# Patient Record
(demographics unavailable — no encounter records)

---

## 2024-12-31 NOTE — HISTORY OF PRESENT ILLNESS
[de-identified] : KAELYN JAQUEZ Was seen on December 31 by 2 way telemedicine he was in his [facility] home and I was in the office on [address]  He has been using the budesonide rinses and has not had another sinus infection since I last saw him.  He had imaging in [place] at [facility] radiology and comes for repeat evaluation

## 2024-12-31 NOTE — ASSESSMENT
[FreeTextEntry1] :  It was my impression that well he still has complaints of a rhinitis and nasal congestion, he has not had a recurrence of his sinus infections.  I recommend continuing with the budesonide.  I will see if we can get a copy of his imaging and would recommend further intervention after that if necessary

## 2024-12-31 NOTE — PHYSICAL EXAM
[FreeTextEntry1] : General: The patient was alert and oriented and in no distress. Voice was clear.  No further evaluation could be done by telemedicine
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
## 2025-07-29 NOTE — ASSESSMENT
[FreeTextEntry1] : It was my impression that he has a chronic rhinitis without significant sinus disease.  He has been getting now maybe 1 infection a year using the budesonide rinses.  I suggested that he continue.  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  He has the reflux and GERD which seems mostly but not completely treated.  I reviewed the diet and suggested alkaline water as well  I would like to see the patient back in follow-up in a year or earlier if needed.
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
## 2025-07-29 NOTE — ASSESSMENT
[FreeTextEntry1] :  GERD: Continue using PPI and H2B has enough avoid eating 3 hours prior to sleep, avoid trigger foods ;    Chronic rhinitis, sinusitis: continue plan as per ENT refilled meds  pt aware that hearing and allergy testing not covered

## 2025-07-29 NOTE — REVIEW OF SYSTEMS
[see HPI] : see HPI [Fever] : no fever [Chills] : no chills [Eyesight Problems] : no eyesight problems [Earache] : no earache [Loss Of Hearing] : no hearing loss [Nosebleeds] : no nosebleeds [Sore Throat] : no sore throat [Chest Pain] : no chest pain [Palpitations] : no palpitations [Shortness Of Breath] : no shortness of breath [Cough] : no cough [Wheezing] : no wheezing [SOB on Exertion] : no shortness of breath during exertion [Constipation] : no constipation [Diarrhea] : no diarrhea [Melena] : no melena [Skin Lesions] : no skin lesions [Change In A Mole] : no change in a mole [Confused] : no confusion [Convulsions] : no convulsions [Easy Bleeding] : no tendency for easy bleeding [Easy Bruising] : no tendency for easy bruising

## 2025-07-29 NOTE — CONSULT LETTER
[FreeTextEntry2] : AMELIA DILLON  [FreeTextEntry1] :   Dear  Dr. AMELIA DILLON,  I had the pleasure of seeing your patient today.   Please see my note below.   Thank you very much for allowing me to participate in the care of your patient.  Sincerely,   Renny Storey MD [facility]  [facility]

## 2025-07-29 NOTE — HEALTH RISK ASSESSMENT
[Patient reported colonoscopy was normal] : Patient reported colonoscopy was normal [ColonoscopyDate] : 7/28/2025

## 2025-07-29 NOTE — PHYSICAL EXAM
[General Appearance - Alert] : alert [General Appearance - In No Acute Distress] : in no acute distress [General Appearance - Well Nourished] : well nourished [General Appearance - Well Developed] : well developed [General Appearance - Well-Appearing] : healthy appearing [Sclera] : the sclera and conjunctiva were normal [PERRL With Normal Accommodation] : pupils were equal in size, round, and reactive to light [Extraocular Movements] : extraocular movements were intact [Outer Ear] : the ears and nose were normal in appearance [Neck Appearance] : the appearance of the neck was normal [Neck Cervical Mass (___cm)] : no neck mass was observed [] : no respiratory distress [Respiration, Rhythm And Depth] : normal respiratory rhythm and effort [Exaggerated Use Of Accessory Muscles For Inspiration] : no accessory muscle use [Auscultation Breath Sounds / Voice Sounds] : lungs were clear to auscultation bilaterally [Heart Rate And Rhythm] : heart rate was normal and rhythm regular [Heart Sounds] : normal S1 and S2 [Edema] : there was no peripheral edema [Bowel Sounds] : normal bowel sounds [Abdomen Soft] : soft [Abdomen Tenderness] : non-tender [Cervical Lymph Nodes Enlarged Posterior Bilaterally] : posterior cervical [Cervical Lymph Nodes Enlarged Anterior Bilaterally] : anterior cervical [Supraclavicular Lymph Nodes Enlarged Bilaterally] : supraclavicular [Abnormal Walk] : normal gait [Involuntary Movements] : no involuntary movements were seen [Skin Color & Pigmentation] : normal skin color and pigmentation [Oriented To Time, Place, And Person] : oriented to person, place, and time [Impaired Insight] : insight and judgment were intact [Affect] : the affect was normal [Mood] : the mood was normal [Memory Recent] : recent memory was not impaired
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
## 2025-07-29 NOTE — PHYSICAL EXAM
[FreeTextEntry1] :  The patient was alert and oriented and in no distress. Voice was clear.  Face: The patient had no facial asymmetry or mass. The skin was unremarkable.  Eyes: The pupils were equal round and reactive to light and accommodation. There was no significant nystagmus or disconjugate gaze noted.  Nose:  The external nose had no significant deformity.  There was no facial tenderness.  On anterior rhinoscopy, the nasal mucosa was clear.  The anterior septum was midline.  There were no visualized polyps purulence  or masses.  Nasal endoscopy:  CPT 18379 Procedure Note:  Endoscopy was done with Covid precautions and with video. All risks and benefits were discussed with the patient and consent obtained.  Nasal endoscopy was done with topical anesthesia of lidocaine and Afrin and a      nasal endoscope. Indication: Nasal congestion, rule out sinusitis. Procedure: The nasal cavity was anesthetized with topical Afrin and Pontocaine. An  endoscope was used and inserted into the nasal cavity. Attention was first paid to the anterior nasal cavity. Endocoscopy was performed to inspect the interior of the nasal cavity, the nasal septum,  the middle and superior meati, the inferior, middle and superior turbinates, and the spheno-ethmoidal  recesses, the nasopharynx and eustachian tube orifices bilaterally. including the nasal mocosa, the possibility of polyps and the consistency of the nasal mucous.  This was done first on the left and then on the right   All findings were normal except:  He has a significant congestion of his left inferior turbinate narrowing the airway and a moderate septal deflection with generalized mucosal congestion but no polyps, purulence or masses   Oral cavity: The oral mucosa was normal. The oral and base of tongue were clear and without mass. The gingival and buccal mucosa were moist and without lesions. The palate moved well. There was no cleft to the palate. There appeared to be good salivary flow.   There was no pus, erythema or mass in the oral cavity.   Ears: The external ears were normal without deformity. The ear canals were clear. The tympanic membranes were intact and normal.  Neck:  The neck was symmetrical. The parotid and submandibular glands were normal without masses. The trachea was midline and there was no unusual crepitus. The thyroid was smooth and nontender and no masses were palpated. There was no significant cervical adenopathy.   Neuro: Neurologically, the patient was awake, alert, and oriented to person, place and time. There were no obvious focal neurologic abnormalities.  Cranial nerves II through XII were grossly intact.   TMJ: The temporomandibular joints were nontender. There was no abnormal crepitus and no significant malocclusion

## 2025-07-29 NOTE — REASON FOR VISIT
[Follow-Up] : a follow-up visit Zoryve Counseling:  I discussed with the patient that Zoryve is not for use in the eyes, mouth or vagina. The most commonly reported side effects include diarrhea, headache, insomnia, application site pain, upper respiratory tract infections, and urinary tract infections.  All of the patient's questions and concerns were addressed.

## 2025-07-29 NOTE — HISTORY OF PRESENT ILLNESS
[FreeTextEntry1] :  HPI: 44 yo male here for his follow up   pt here for follow up of wtc covered sinusitis and GERD  sinusitis- -last sinus infection 05/2025 -Has been using nasal saline rinses with Budesonide and Claritin D and occasional Flonase as tolerated.  -The patient has an appt with ENT today   GERD- -He follows with Dr. Batista - taking famotidine 40 nightly, which typically manages his reflux, reflux burning 2-3 times/month generally if misses med ; feels ok if avoids spicy foods pt states that he will take 2 weeks of ppi at a time Central 2025    Pt reports to have had an US with mass found on the right kidney he has a follow up with Dr. Joyner urology/oncology in  had  CT/ was neg